# Patient Record
Sex: FEMALE | Race: WHITE
[De-identification: names, ages, dates, MRNs, and addresses within clinical notes are randomized per-mention and may not be internally consistent; named-entity substitution may affect disease eponyms.]

---

## 2017-01-24 NOTE — US
Dear Dr. Holley,



Thank you very much for allowing us to see your patient, Ivis Nur.  As you know, she is a 26 ye
ar old,  who was asked to see us to confirm dating and for 1st trimester genetic screening. Her 
pregnancy has been uncomplicated to date.



She denies any cramping, leakage of fluid, or vaginal bleeding.



DATING:



LMP: 10/27/16, and 9 week US

Gestational Age by Dates: 12 weeks 5 days

EDC: 8/3/17



US FINDINGS:



Fetal Number: 1 

Placenta: Left lateral

FHR: 161 bpm



CRL: 70 mm

Gestational Age: 13 weeks 0 days



Nuchal Translucency: 1.6 mm



Nasal Bone: Present



Evaluation of first trimester anatomy shows a normal calvarium, choroid plexus, stomach, abdominal co
rd insertion, legs, and arms.



IMPRESSION: 



1. Genetic Screening: Today, we saw a normal NT measurement and a nasal bone. These findings decrease
 the fetal risk of Trisomy 21. We discussed the option of additional screening with either the Sequen
tial Screen, which has a 95% detection rate for Trisomy 21 and also screens for ONTD and Trisomy 18, 
or NIPT, which has a 99% detection rate for Trisomy 21 and also screens for Trisomy 13 & 18 and sex c
hromosome aneuploidy. Lastly, we discussed the option of diagnostic testing via CVS or amniocentesis 
which is the only way to definitively exclude fetal aneuploidy. After our discussion, the couple opte
d to proceed with the Sequential Screen.

- 1st trimester blood draw performed today. We will call the patient with the results.

- Instructions were given for the 2nd trimester blood draw after 15 weeks.

- Anatomy US at 19-20 weeks



2. EDC: Based on today's ultrasound, the CRL is consistent with the previously stated EDC of 8/3/17, 
which is based on LMP and 9 week US. This should be finalized as her due date.



Thank you again for sending this patient to see us today. Approximately 15 minutes of a total visit t
andrew of 12 minutes were spent with this patient today in direct face to face counseling regarding toda
y's US findings and the above recommendations.



If you have any questions, please do not hesitate to contact me at [phone]. 



Onelia Reynoso MD 

Maternal-Fetal Medicine

## 2017-01-24 NOTE — US
Obstetrical Sonogram



History:  26-year-old with estimated gestational age of 12 weeks 5 days and EDC of August 3, 2017.



Comparison: None available.



Findings: There is a single living intrauterine pregnancy with a heart rate of 161 beats per minute. 
 Average crown-rump length is 7.0 cm, for estimated gestational age of 13 weeks 0 days.  Nuchal trans
lucency is 1.6 mm.  The nasal bone is visible.  The amniotic fluid volume is subjectively normal.  No
 overt anatomic abnormality is identified, although assessment is limited by early gestational age.  
The placenta is left lateral. The ovaries are not visible.



Impression:

1.  Single living intrauterine gestation with size consistent with dates.

2.  Normal nuchal translucency.

3.  Detailed anatomic survey is recommended at 19-20 weeks.



Please see separate dictation for  consultation performed by Onelia Reynoso MD, the same viktor alves.

## 2017-07-23 NOTE — OBGCSDC
General Delivery Information





- General Info


: 1


Para: 0


Abortions: 0


Delivery Physician/CNM: Tori Avery


Admission Date: 17


Labs: 





 











Patient ABO/Rh  O POSITIVE   17  03:45    


 


Hct  37.0 % (38.0-47.0)  L  17  03:45    














Vaginal Birth





- Diagnosis


Labor: Spontaneous


Rupture of Membranes Type: Spontaneous


Amniotic Fluid Color: Clear


Laceration: 1st Degree


Repair: 3-0, Vicryl


Delivery Events: None





- Hospital Course


Antepartum: 


none


Intrapartum: 


none


Postpartum: 


none





 Birth





-  Delivery


Birth Type: Vaginal


EBL: 100 ml





[place] Birth Data


  ** [place]


Delivery Date: 17


Delivery Time: 11:56


ANDREA: 17


Gestational Age: 38 week(s) and 3 day(s)


Sex of Infant: Female


Apgar Score (1 Min): 8


Apgar Score (5 Min): 9

## 2017-07-23 NOTE — OBPROG
OBG Labor Progress Note


Assessment/Plan: 


Assessment:


28 yo  @ 39 3/7, labor




















Plan:





17 11:08


FWB reassuring.


GBS neg.


Expect .


Subjective: 





28 yo  @ 39 3/7, labor-pushing now.


Objective: 





 





 17 03:45 





 17 03:45 





 











Patient ABO/Rh  O POSITIVE   17  03:45    


 


Uric Acid  5.9 mg/dL (2.5-6.8)   17  03:45    


 


Total Bilirubin  0.3 mg/dL (0.1-1.4)   17  03:45    


 


Conjugated Bilirubin  0.2 mg/dL (0.0-0.5)   17  03:45    


 


Unconjugated Bilirubin  0.1 mg/dL (0.0-1.1)   17  03:45    


 


AST  17 IU/L (14-46)   17  03:45    


 


ALT  26 IU/L (9-52)   17  03:45    


 


Lactate Dehydrogenase  420 IU/L (313-618)   17  03:45    














- SVE


Dilation (cm): 10


Effacement (%): 100


Dilation Complete Date: 17


Dilation Complete Time: 10:45


  ** [place]


Current Contraction Pattern: Regular


FHR (bpm): 150


FHR Pattern Variability: Moderate


FHR Category: 2


Membranes: SROM


Amniotic Fluid Color: Clear





- Physical Exam


Estimated Fetal Weight: 2501-3400g





Oxytocin Orders Assessment





- Pre-Induction/Augmentation Assessment


Gestational Age: 38 week(s) and 3 day(s)


Estimated Fetal Weight: 2501-3400g





ICD10 Worksheet


Patient Problems: 


 Problems











Problem Status Onset


 


39 weeks gestation of pregnancy Acute  


 


PROM (premature rupture of membranes) Acute

## 2017-07-23 NOTE — PDGENHP
History and Physical





- Chief Complaint


27 y.o.  at 39 3/7 with PROM





- History of Present Illness


27 y.o.  female at 39 3/7 weeks with PROM.


 Mildly elevated BPs with  normal PIH labs and no current PIH symptoms.


 Afebrile.


 NST_ reactive CAT I


 SVE- 2/60/-3 Early labor. GBS NEG





History Information





- Allergies/Home Medication List


Allergies/Adverse Reactions: 








No Known Allergies Allergy (Unverified 17 19:24)


 





Home Medications: 








Prenatal Vitamins Tablet 1 tab DAILY 17 [Last Taken 17 08:00]


Zyrtec 10 mg PRN MDD 10 17 [Last Taken 17 08:00]





I have personally reviewed and updated: family history, medical history, social 

history, surgical history





- Past Medical History


asthma





- Surgical History


Reports: no pertinent surgical hx





- Family History


Positive for: asthma, cancer, lung disease





- Social History


Smoking Status: Never smoked


Alcohol Use: None


Drug Use: None





Review of Systems


ROS: 10pt was reviewed & negative except for what was stated in HPI & below


Constitutional: Reports: no symptoms


EENMT: Reports: no symptoms


Cardiac: Reports: no symptoms


Respiratory: Reports: no symptoms


Gastrointestinal: Reports: no symptoms


Genitourinary: Reports: no symptoms


Muscolosketal: Reports: no symptoms


Skin: Reports: no symptoms


Neurological: Reports: no symptoms


Hematologic/Lymphatic: Reports: no symptoms


Immunologic/Allergy: Reports: no symptoms





Physical Exam


Constitutional: no apparent distress


Eyes: PERRL


Ears, Nose, Mouth, Throat: hearing normal, ears appear normal


Cardiovascular: regular rate and rhythym, no murmur, rub, or gallop


Respiratory: no respiratory distress


Gastrointestinal: soft, non-tender abdomen


Genitourinary: no bladder fullness, no bladder tenderness


Skin: warm, normal color


Musculoskeletal: full muscle strength


Neurologic: AAOx3, sensation intact bilaterally


Psychiatric: interacting appropriately, not anxious


Lymph, Heme, Immunologic: no cervical LAD





Lab Data & Imaging Review





 17 03:45





 17 03:45














WBC  10.92 10^3/uL (3.80-9.50)  H  17  03:45    


 


RBC  4.21 10^6/uL (4.18-5.33)   17  03:45    


 


Hgb  12.8 g/dL (12.6-16.3)   17  03:45    


 


Hct  37.0 % (38.0-47.0)  L  17  03:45    


 


MCV  87.9 fL (81.5-99.8)   17  03:45    


 


MCH  30.4 pg (27.9-34.1)   17  03:45    


 


MCHC  34.6 g/dL (32.4-36.7)   17  03:45    


 


RDW  13.4 % (11.5-15.2)   17  03:45    


 


Plt Count  167 10^3/uL (150-400)   17  03:45    


 


MPV  11.2 fL (8.7-11.7)   17  03:45    


 


Neut % (Auto)  79.5 % (39.3-74.2)  H  17  03:45    


 


Lymph % (Auto)  12.4 % (15.0-45.0)  L  17  03:45    


 


Mono % (Auto)  6.0 % (4.5-13.0)   17  03:45    


 


Eos % (Auto)  1.1 % (0.6-7.6)   17  03:45    


 


Baso % (Auto)  0.4 % (0.3-1.7)   17  03:45    


 


Nucleat RBC Rel Count  0.0 % (0.0-0.2)   17  03:45    


 


Absolute Neuts (auto)  8.69 10^3/uL (1.70-6.50)  H  17  03:45    


 


Absolute Lymphs (auto)  1.35 10^3/uL (1.00-3.00)   17  03:45    


 


Absolute Monos (auto)  0.65 10^3/uL (0.30-0.80)   17  03:45    


 


Absolute Eos (auto)  0.12 10^3/uL (0.03-0.40)   17  03:45    


 


Absolute Basos (auto)  0.04 10^3/uL (0.02-0.10)   17  03:45    


 


Absolute Nucleated RBC  0.00 10^3/uL (0-0.01)   17  03:45    


 


Immature Gran %  0.6 % (0.0-1.1)   17  03:45    


 


Immature Gran #  0.07 10^3/uL (0.00-0.10)   17  03:45    


 


BUN  10 mg/dL (7-23)   17  03:45    


 


Creatinine  0.6 mg/dL (0.6-1.0)   17  03:45    


 


Estimated GFR  > 60   17  03:45    


 


Uric Acid  5.9 mg/dL (2.5-6.8)   17  03:45    


 


Total Bilirubin  0.3 mg/dL (0.1-1.4)   17  03:45    


 


Conjugated Bilirubin  0.2 mg/dL (0.0-0.5)   17  03:45    


 


Unconjugated Bilirubin  0.1 mg/dL (0.0-1.1)   17  03:45    


 


AST  17 IU/L (14-46)   17  03:45    


 


ALT  26 IU/L (9-52)   17  03:45    


 


Lactate Dehydrogenase  420 IU/L (313-618)   17  03:45    


 


Patient ABO/Rh  O POSITIVE   17  03:45    


 


Antibody Screen  NEGATIVE   17  03:45    











Assessment & Plan


Assessment: 


27 y.o.  @ 39 3/7 weeks with PROM


 VS- mildly elevated BPs with normal PIH labs. Afebrile


 NST- reactive CAT I. GBS NEG.


 Patient would like to ambulate and then reevaluate in 2 hours. 





Plan: 


 Allow patient to ambulate x 2 hours.


  Reevaluate for active labor.


  VS and EFM per protocol.

## 2017-07-23 NOTE — OBDEL
Birth Info


Birth Type: Vaginal


GBS+: No


Indications for Delivery: Spontaneous Labor





Vaginal Delivery





- Labor and Delivery


Onset of Contractions Date: 17


Onset of Contractions Time: 06:00


Onset of Contractions Type: Spontaneous


Rupture of Membranes Date: 17


Rupture of Membranes Time: 00:45


Rupture of Membranes Type: Spontaneous


Amniotic Fluid Color: Clear


Dilation Complete Date: 17


Dilation Complete Time: 10:45


Placenta Delivery Date: 17


Laceration: 1st Degree


Repair: 3-0, Vicryl


Vaginal Sponge Count Correct: Yes


Vaginal Needle Count Correct: Yes


Vaginal Sweep Performed: No


EBL: 100 ml


Delivery Events: None





- Medications


Labor Augmentation/Induction Methods Used: None





 Birth Data


  ** [place]


Delivery Date: 17


Delivery Time: 11:56


ANDREA: 17


Gestational Age: 38 week(s) and 3 day(s)


Sex of Infant: Female


Apgar Score (1 Min): 8


Apgar Score (5 Min): 9





ICD10 Worksheet


Patient Problems: 


 Problems











Problem Status Onset


 


39 weeks gestation of pregnancy Acute  


 


PROM (premature rupture of membranes) Acute

## 2017-07-23 NOTE — OBPROG
OBG Labor Progress Note


Assessment/Plan: 


Assessment:


27 y.o.  with PROM. 


Mildly elevated BPs with normal PIH labs.


NST- reactive CAT I. GBS NEG.























Plan:


Allow patient to ambulate x 2 hours and reevaluate for labor. VS and EFM per 

protocol


17 09:20





Subjective: 


 Comfortable and resting in bed with  present. Leaking clear fluid.





Objective: 





 





 17 03:45 





 17 03:45 





 











Patient ABO/Rh  O POSITIVE   17  03:45    


 


Uric Acid  5.9 mg/dL (2.5-6.8)   17  03:45    


 


Total Bilirubin  0.3 mg/dL (0.1-1.4)   17  03:45    


 


Conjugated Bilirubin  0.2 mg/dL (0.0-0.5)   17  03:45    


 


Unconjugated Bilirubin  0.1 mg/dL (0.0-1.1)   17  03:45    


 


AST  17 IU/L (14-46)   17  03:45    


 


ALT  26 IU/L (9-52)   17  03:45    


 


Lactate Dehydrogenase  420 IU/L (313-618)   17  03:45    














- SVE


Dilation (cm): 2


Effacement (%): 50


Station: -3





- Physical Exam


General Appearance: WD/WN, alert, no apparent distress


Estimated Fetal Weight: 2501-3400g


EENT: normal ENT inspection


Neck: non-tender, full range of motion, normal inspection


Respiratory: lungs clear, normal breath sounds


Cardiac/Chest: regular rate, rhythm


Abdomen: non-tender, soft


Extremities: non-tender, normal inspection


Back: Normal inspection


Skin: normal color, warm/dry


Neuro/Psych: alert, normal mood/affect, oriented x 3





Oxytocin Orders Assessment





- Pre-Induction/Augmentation Assessment


Gestational Age: 38 week(s) and 3 day(s)





ICD10 Worksheet


Patient Problems: 


 Problems











Problem Status Onset


 


39 weeks gestation of pregnancy Acute  


 


PROM (premature rupture of membranes) Acute  














- ICD10 Problem Qualifiers


(1) PROM (premature rupture of membranes)


Qualifiers: 


   PROM onset of labor timing: P   PROM gestational age: P 





(2) 39 weeks gestation of pregnancy

## 2017-07-24 NOTE — OBPP
PostPartum Progress Note


Assessment/Plan: 


Assessment:


27 y.o.  s/p . PPD #1. Recovering well.


Mildly elevated BPs with normal PIH labs. GBS NEG.























Plan:


Routine postpartum orders. Continue to monitor BPs. Lactation consult 


17 09:20





17 07:47





Subjective: 


 Reports feeling well with good pain control and light vaginal bleeding. 

Breastfeeding infant. Eating and drinking without nausea or vomiting. 

Ambulating without vertigo. Voiding and stooling without difficulty. 

Appropriate mood with good support system.





Objective: 





 





 17 03:45 





 17 03:45 





 











Patient ABO/Rh  O POSITIVE   17  03:45    


 


Uric Acid  5.9 mg/dL (2.5-6.8)   17  03:45    


 


Total Bilirubin  0.3 mg/dL (0.1-1.4)   17  03:45    


 


Conjugated Bilirubin  0.2 mg/dL (0.0-0.5)   17  03:45    


 


Unconjugated Bilirubin  0.1 mg/dL (0.0-1.1)   17  03:45    


 


AST  17 IU/L (14-46)   17  03:45    


 


ALT  26 IU/L (9-52)   17  03:45    


 


Lactate Dehydrogenase  420 IU/L (313-618)   17  03:45    








 











Temp Pulse Resp BP Pulse Ox


 


 36.7 C   106 H  18   113/66   92 


 


 17 20:00  17 20:00  17 20:00  17 04:50  17 04:50











Uterine Position/Fundal Height: Umbilicus -1


Uterine Tone: Firm





PostPartum Physical Exam





- Physical Exam


General Appearance: WD/WN, alert, no apparent distress


EENT: normal ENT inspection


Neck: non-tender, full range of motion, normal inspection


Respiratory: lungs clear, normal breath sounds


Cardiac/Chest: regular rate, rhythm


Abdomen: non-tender, soft


Extremities: non-tender, normal inspection


Back: Normal inspection


Skin: normal color, warm/dry


Neuro/Psych: alert, normal mood/affect, oriented x 3

## 2017-07-25 NOTE — OBGCSDC
General Delivery Information





- General Info


: 1


Para: 1


Delivery Physician/CNM: Tori Avery


Admission Date: 17


Labs: 





 











Patient ABO/Rh  O POSITIVE   17  03:45    


 


Hct  37.0 % (38.0-47.0)  L  17  03:45    














Vaginal Birth





- Diagnosis


Labor: Spontaneous


Presentation at Delivery: Vertex


Rupture of Membranes Type: Spontaneous


Amniotic Fluid Color: Clear


Laceration: 1st Degree


Repair: 3-0, Vicryl


Delivery Events: None





- Operations/Procedures


L&D Analgesia/Anesthesia Type: Local





 Birth





-  Delivery


L&D Analgesia/Anesthesia Type: Local





[place] Birth Data


  ** [place]


Delivery Date: 17


Delivery Time: 11:56


ANDREA: 17


Gestational Age: 38 week(s) and 5 day(s)


Sex of Infant: Female


[place] Weight (gm): 2816 g


Apgar Score (1 Min): 8


Apgar Score (5 Min): 9





Discharge Information





- Discharge Information


Discharge Medications: Ibuprofen, Prenatal Vitamins


Condition: Good


Instruction/Follow Up: Four Weeks, Six Weeks


Discharge Physician/CNM: Viji Carmona

## 2019-02-25 ENCOUNTER — HOSPITAL ENCOUNTER (OUTPATIENT)
Dept: HOSPITAL 80 - FIMAGING | Age: 29
End: 2019-02-25
Attending: OBSTETRICS & GYNECOLOGY
Payer: COMMERCIAL

## 2019-02-25 DIAGNOSIS — Z3A.13: ICD-10-CM

## 2019-02-25 DIAGNOSIS — Z34.91: Primary | ICD-10-CM

## 2019-04-09 ENCOUNTER — HOSPITAL ENCOUNTER (OUTPATIENT)
Dept: HOSPITAL 80 - FIMAGING | Age: 29
End: 2019-04-09
Attending: OBSTETRICS & GYNECOLOGY
Payer: COMMERCIAL

## 2019-04-09 DIAGNOSIS — Z34.82: Primary | ICD-10-CM

## 2019-04-09 DIAGNOSIS — Z3A.19: ICD-10-CM
